# Patient Record
Sex: FEMALE | Race: ASIAN | NOT HISPANIC OR LATINO | URBAN - METROPOLITAN AREA
[De-identification: names, ages, dates, MRNs, and addresses within clinical notes are randomized per-mention and may not be internally consistent; named-entity substitution may affect disease eponyms.]

---

## 2021-04-06 ENCOUNTER — OFFICE VISIT (OUTPATIENT)
Dept: URBAN - METROPOLITAN AREA CLINIC 27 | Facility: CLINIC | Age: 41
End: 2021-04-06

## 2021-04-06 PROBLEM — 429006005 FAMILY HISTORY OF MALIGNANT NEOPLASM OF GASTROINTESTINAL TRACT: Status: ACTIVE | Noted: 2021-04-06

## 2021-04-08 ENCOUNTER — OFFICE VISIT (OUTPATIENT)
Dept: URBAN - METROPOLITAN AREA SURGERY CENTER 7 | Facility: SURGERY CENTER | Age: 41
End: 2021-04-08

## 2022-02-11 ENCOUNTER — OFFICE VISIT (OUTPATIENT)
Dept: URBAN - METROPOLITAN AREA CLINIC 27 | Facility: CLINIC | Age: 42
End: 2022-02-11

## 2022-04-11 ENCOUNTER — OFFICE VISIT (OUTPATIENT)
Dept: URBAN - METROPOLITAN AREA CLINIC 27 | Facility: CLINIC | Age: 42
End: 2022-04-11

## 2022-04-30 ENCOUNTER — TELEPHONE ENCOUNTER (OUTPATIENT)
Dept: URBAN - METROPOLITAN AREA CLINIC 121 | Facility: CLINIC | Age: 42
End: 2022-04-30

## 2022-05-01 ENCOUNTER — TELEPHONE ENCOUNTER (OUTPATIENT)
Dept: URBAN - METROPOLITAN AREA CLINIC 121 | Facility: CLINIC | Age: 42
End: 2022-05-01

## 2022-05-01 RX ORDER — NITROGLYCERIN 4 MG/G
APPLY TO RECTAL AREA THREE TIMES A DAY OINTMENT RECTAL
Status: ACTIVE | COMMUNITY
Start: 2013-06-25

## 2022-05-01 RX ORDER — NORETHINDRONE AND ETHINYL ESTRADIOL AND FERROUS FUMARATE 0.8-25(24)
KIT ORAL
Status: ACTIVE | COMMUNITY
Start: 2013-06-25

## 2022-05-01 RX ORDER — DICYCLOMINE HYDROCHLORIDE 10 MG/1
TAKE 1 CAPSULE BY MOUTH THREE TIMES A DAY AS NEEDED FOR SPASM CAPSULE ORAL
Status: ACTIVE | COMMUNITY
Start: 2022-02-11

## 2022-05-01 RX ORDER — HYDROCORTISONE ACETATE AND PRAMOXINE HYDROCHLORIDE 25; 10 MG/G; MG/G
APPLY RECTALLY THREE TIMES A DAY CREAM TOPICAL
Status: ACTIVE | COMMUNITY
Start: 2013-06-25

## 2024-06-05 ENCOUNTER — DASHBOARD ENCOUNTERS (OUTPATIENT)
Age: 44
End: 2024-06-05

## 2024-06-05 ENCOUNTER — OFFICE VISIT (OUTPATIENT)
Dept: URBAN - METROPOLITAN AREA CLINIC 115 | Facility: CLINIC | Age: 44
End: 2024-06-05
Payer: COMMERCIAL

## 2024-06-05 VITALS
BODY MASS INDEX: 27.09 KG/M2 | HEIGHT: 60 IN | HEART RATE: 60 BPM | WEIGHT: 138 LBS | TEMPERATURE: 97.5 F | SYSTOLIC BLOOD PRESSURE: 112 MMHG | DIASTOLIC BLOOD PRESSURE: 75 MMHG

## 2024-06-05 DIAGNOSIS — K64.4 HEMORRHOIDS, EXTERNAL: ICD-10-CM

## 2024-06-05 DIAGNOSIS — K58.0 IRRITABLE BOWEL SYNDROME WITH DIARRHEA: ICD-10-CM

## 2024-06-05 PROBLEM — 197125005: Status: ACTIVE | Noted: 2024-06-05

## 2024-06-05 PROBLEM — 195469007: Status: ACTIVE | Noted: 2024-06-05

## 2024-06-05 PROCEDURE — 99213 OFFICE O/P EST LOW 20 MIN: CPT | Performed by: INTERNAL MEDICINE

## 2024-06-05 PROCEDURE — 99203 OFFICE O/P NEW LOW 30 MIN: CPT | Performed by: INTERNAL MEDICINE

## 2024-06-05 RX ORDER — HYDROCORTISONE ACETATE AND PRAMOXINE HYDROCHLORIDE 25; 10 MG/G; MG/G
APPLY RECTALLY THREE TIMES A DAY CREAM TOPICAL
Status: ACTIVE | COMMUNITY
Start: 2013-06-25

## 2024-06-05 RX ORDER — NORETHINDRONE AND ETHINYL ESTRADIOL AND FERROUS FUMARATE 0.8-25(24)
KIT ORAL
Status: ACTIVE | COMMUNITY
Start: 2013-06-25

## 2024-06-05 RX ORDER — NITROGLYCERIN 4 MG/G
APPLY TO RECTAL AREA THREE TIMES A DAY OINTMENT RECTAL
Status: ACTIVE | COMMUNITY
Start: 2013-06-25

## 2024-06-05 RX ORDER — DICYCLOMINE HYDROCHLORIDE 10 MG/1
TAKE 1 CAPSULE BY MOUTH THREE TIMES A DAY AS NEEDED FOR SPASM CAPSULE ORAL
Status: ACTIVE | COMMUNITY
Start: 2022-02-11

## 2024-06-05 NOTE — HPI-TODAY'S VISIT:
44-year-old female patient came into the office for complaints of having rectal discomfort and skin tag requiring frequent wiping.  Patient stated she has had a family history of colon cancer in his 70s and she has had a colonoscopy in April 2021 which was unremarkable except for hemorrhoids both internal and external hemorrhoids.  Patient reports having a frequent urgency of the bowel movements and abdominal bowel gas bloating and distention.  Patient reports her symptoms are getting worse recently along with a urinary frequent urinary symptoms and patient stated she has been seen by urologist who has recommended diet patient stated she has been having anxiety and stress.  Patient denies any unintentional weight loss.  Prior workup from the GI was reviewed.

## 2025-03-06 ENCOUNTER — OFFICE VISIT (OUTPATIENT)
Dept: URBAN - METROPOLITAN AREA CLINIC 27 | Facility: CLINIC | Age: 45
End: 2025-03-06
Payer: COMMERCIAL

## 2025-03-06 VITALS
HEIGHT: 60 IN | WEIGHT: 136 LBS | SYSTOLIC BLOOD PRESSURE: 106 MMHG | DIASTOLIC BLOOD PRESSURE: 72 MMHG | BODY MASS INDEX: 26.7 KG/M2 | HEART RATE: 73 BPM

## 2025-03-06 DIAGNOSIS — Z80.0 FAMILY HISTORY OF MALIGNANT NEOPLASM OF DIGESTIVE ORGANS: ICD-10-CM

## 2025-03-06 DIAGNOSIS — K58.0 IRRITABLE BOWEL SYNDROME WITH DIARRHEA: ICD-10-CM

## 2025-03-06 PROCEDURE — 99214 OFFICE O/P EST MOD 30 MIN: CPT | Performed by: INTERNAL MEDICINE

## 2025-03-06 RX ORDER — HYDROCORTISONE ACETATE AND PRAMOXINE HYDROCHLORIDE 25; 10 MG/G; MG/G
APPLY RECTALLY THREE TIMES A DAY CREAM TOPICAL
Status: ACTIVE | COMMUNITY
Start: 2013-06-25

## 2025-03-06 RX ORDER — NITROGLYCERIN 4 MG/G
APPLY TO RECTAL AREA THREE TIMES A DAY OINTMENT RECTAL
Status: ACTIVE | COMMUNITY
Start: 2013-06-25

## 2025-03-06 RX ORDER — NORETHINDRONE AND ETHINYL ESTRADIOL AND FERROUS FUMARATE 0.8-25(24)
KIT ORAL
Status: ACTIVE | COMMUNITY
Start: 2013-06-25

## 2025-03-06 RX ORDER — DICYCLOMINE HYDROCHLORIDE 10 MG/1
TAKE 1 CAPSULE BY MOUTH THREE TIMES A DAY AS NEEDED FOR SPASM CAPSULE ORAL
Status: ACTIVE | COMMUNITY
Start: 2022-02-11

## 2025-03-06 NOTE — HPI-TODAY'S VISIT:
This is a 44-year-old female seen in consultation today for her change in bowel habits.  She has frequent bowel movements with urgency and occasional leakage.  Been ongoing for more than a year.  She is on no medicines but does use Imodium as needed.  She takes Metamucil sometimes.  She has a family history of colorectal cancer.  Her last colonoscopy was April 2021.  She is concerned about the symptoms because they are getting in the way of her quality of life.  She also has burning in the perianal area.  She tries to use wet wipes.

## 2025-03-07 LAB
C-REACTIVE PROTEIN, QUANT: <3
IMMUNOGLOBULIN A: 163
INTERPRETATION: (no result)
TISSUE TRANSGLUTAMINASE AB, IGA: <1

## 2025-03-13 LAB
CALPROTECTIN, FECAL: 15
CLOSTRIDIUM DIFFICILE TOXINB,QL REAL TIME PCR: NOT DETECTED
FECAL FAT, QUALITATIVE: NORMAL
PANCREATIC ELASTASE, FECAL: >800

## 2025-04-04 ENCOUNTER — OFFICE VISIT (OUTPATIENT)
Dept: URBAN - METROPOLITAN AREA CLINIC 27 | Facility: CLINIC | Age: 45
End: 2025-04-04

## 2025-05-02 ENCOUNTER — OFFICE VISIT (OUTPATIENT)
Dept: URBAN - METROPOLITAN AREA CLINIC 27 | Facility: CLINIC | Age: 45
End: 2025-05-02

## 2025-06-06 ENCOUNTER — OFFICE VISIT (OUTPATIENT)
Dept: URBAN - METROPOLITAN AREA CLINIC 27 | Facility: CLINIC | Age: 45
End: 2025-06-06
Payer: COMMERCIAL

## 2025-06-06 DIAGNOSIS — R19.7 DIARRHEA, UNSPECIFIED: ICD-10-CM

## 2025-06-06 DIAGNOSIS — K58.0 IRRITABLE BOWEL SYNDROME WITH DIARRHEA: ICD-10-CM

## 2025-06-06 PROCEDURE — 99214 OFFICE O/P EST MOD 30 MIN: CPT | Performed by: INTERNAL MEDICINE

## 2025-06-06 RX ORDER — NORETHINDRONE AND ETHINYL ESTRADIOL AND FERROUS FUMARATE 0.8-25(24)
KIT ORAL
Status: ACTIVE | COMMUNITY
Start: 2013-06-25

## 2025-06-06 RX ORDER — DICYCLOMINE HYDROCHLORIDE 10 MG/1
TAKE 1 CAPSULE BY MOUTH THREE TIMES A DAY AS NEEDED FOR SPASM CAPSULE ORAL
Status: ACTIVE | COMMUNITY
Start: 2022-02-11

## 2025-06-06 RX ORDER — NITROGLYCERIN 4 MG/G
APPLY TO RECTAL AREA THREE TIMES A DAY OINTMENT RECTAL
Status: ACTIVE | COMMUNITY
Start: 2013-06-25

## 2025-06-06 RX ORDER — CHOLESTYRAMINE 4 G/9G
1 PACKET MIXED WITH WATER OR NON-CARBONATED DRINK POWDER, FOR SUSPENSION ORAL ONCE A DAY
Qty: 30 | OUTPATIENT
Start: 2025-06-09

## 2025-06-06 RX ORDER — CHOLESTYRAMINE 4 G/9G
1 PACKET MIXED WITH WATER OR NON-CARBONATED DRINK POWDER, FOR SUSPENSION ORAL ONCE A DAY
Qty: 30 | Refills: 1 | OUTPATIENT
Start: 2025-06-06

## 2025-06-06 RX ORDER — HYDROCORTISONE ACETATE AND PRAMOXINE HYDROCHLORIDE 25; 10 MG/G; MG/G
APPLY RECTALLY THREE TIMES A DAY CREAM TOPICAL
Status: ACTIVE | COMMUNITY
Start: 2013-06-25

## 2025-06-06 NOTE — HPI-HPI
Annabel Llamas is a 45-year-old female who reports ongoing gastrointestinal symptoms for nearly two years. She describes frequent diarrhea, which she manages by taking Imodium approximately four times a week to prevent emergencies while out. Despite this, she experiences constipation and discomfort. Annabel has also tried Metamucil, but she feels it sometimes exacerbates her constipation, depending on the timing of ingestion. She is concerned about the presence of mucus in her stool, although previous stool studies showed no inflammation or infection.

## 2025-08-15 ENCOUNTER — OFFICE VISIT (OUTPATIENT)
Dept: URBAN - METROPOLITAN AREA CLINIC 27 | Facility: CLINIC | Age: 45
End: 2025-08-15